# Patient Record
Sex: MALE | URBAN - METROPOLITAN AREA
[De-identification: names, ages, dates, MRNs, and addresses within clinical notes are randomized per-mention and may not be internally consistent; named-entity substitution may affect disease eponyms.]

---

## 2023-04-24 ENCOUNTER — LAB OUTSIDE AN ENCOUNTER (OUTPATIENT)
Dept: URBAN - METROPOLITAN AREA CLINIC 33 | Facility: CLINIC | Age: 43
End: 2023-04-24

## 2023-04-24 ENCOUNTER — OFFICE VISIT (OUTPATIENT)
Dept: URBAN - METROPOLITAN AREA CLINIC 33 | Facility: CLINIC | Age: 43
End: 2023-04-24
Payer: COMMERCIAL

## 2023-04-24 ENCOUNTER — DASHBOARD ENCOUNTERS (OUTPATIENT)
Age: 43
End: 2023-04-24

## 2023-04-24 VITALS
WEIGHT: 163 LBS | BODY MASS INDEX: 22.08 KG/M2 | OXYGEN SATURATION: 98 % | SYSTOLIC BLOOD PRESSURE: 108 MMHG | HEART RATE: 65 BPM | DIASTOLIC BLOOD PRESSURE: 78 MMHG | HEIGHT: 72 IN

## 2023-04-24 DIAGNOSIS — Z80.0 FAMILY HISTORY OF COLON CANCER IN FATHER: ICD-10-CM

## 2023-04-24 DIAGNOSIS — K59.01 SLOW TRANSIT CONSTIPATION: ICD-10-CM

## 2023-04-24 DIAGNOSIS — R19.4 CHANGE IN BOWEL HABIT: ICD-10-CM

## 2023-04-24 PROBLEM — 88111009: Status: ACTIVE | Noted: 2023-04-24

## 2023-04-24 PROBLEM — 35298007: Status: ACTIVE | Noted: 2023-04-24

## 2023-04-24 PROCEDURE — 99203 OFFICE O/P NEW LOW 30 MIN: CPT | Performed by: INTERNAL MEDICINE

## 2023-04-24 RX ORDER — CHOLECALCIFEROL (VITAMIN D3) 50 MCG
1 TABLET TABLET ORAL ONCE A DAY
Status: ACTIVE | COMMUNITY

## 2023-04-24 RX ORDER — POLYETHYLENE GLYCOL 3350 17 G/17G
AS DIRECTED POWDER, FOR SOLUTION ORAL
Status: ACTIVE | COMMUNITY

## 2023-04-24 RX ORDER — SODIUM, POTASSIUM,MAG SULFATES 17.5-3.13G
177ML SOLUTION, RECONSTITUTED, ORAL ORAL
Qty: 1 | Refills: 0 | OUTPATIENT
Start: 2023-04-24 | End: 2023-04-26

## 2023-04-24 RX ORDER — MULTIVITAMIN WITH IRON
AS DIRECTED TABLET ORAL
Status: ACTIVE | COMMUNITY

## 2023-04-24 NOTE — HPI-CONSTIPATION
42 year old male patient presents for constipation consult. Patient states symptoms began three months ago. Patient admits taking Miralax with some relief. Patient admits having 1-2 bowel movement a day. Patient admits stools are normal. Patient denies rectal bleeding /melena. Patient denies strenuous bowel movements.   Patient denies having recent abdominal Xray. Patient admits having recent labs.

## 2023-04-24 NOTE — HPI-HEARTBURN
Patient presents for heartburn consult. Patient states he has been experiencing symptoms since two years ago. Patient denies currently taking any OTC or prescribed medications for relief of symptoms. Patient describes symptoms as tightness in chest and states they are most present at nighttime. Patient denies nausea or vomiting. Patient denies any associated weight loss. Patient denies EGD in the past.

## 2024-08-19 ENCOUNTER — OFFICE VISIT (OUTPATIENT)
Dept: URBAN - METROPOLITAN AREA CLINIC 33 | Facility: CLINIC | Age: 44
End: 2024-08-19
Payer: COMMERCIAL

## 2024-08-19 VITALS
BODY MASS INDEX: 23.16 KG/M2 | OXYGEN SATURATION: 99 % | WEIGHT: 171 LBS | HEIGHT: 72 IN | SYSTOLIC BLOOD PRESSURE: 114 MMHG | DIASTOLIC BLOOD PRESSURE: 74 MMHG | HEART RATE: 80 BPM

## 2024-08-19 DIAGNOSIS — R19.4 CHANGE IN BOWEL HABIT: ICD-10-CM

## 2024-08-19 PROCEDURE — 99243 OFF/OP CNSLTJ NEW/EST LOW 30: CPT | Performed by: INTERNAL MEDICINE

## 2024-08-19 PROCEDURE — 99213 OFFICE O/P EST LOW 20 MIN: CPT | Performed by: INTERNAL MEDICINE

## 2024-08-19 RX ORDER — POLYETHYLENE GLYCOL 3350 17 G/17G
AS DIRECTED POWDER, FOR SOLUTION ORAL
Status: ON HOLD | COMMUNITY

## 2024-08-19 RX ORDER — MULTIVITAMIN WITH IRON
AS DIRECTED TABLET ORAL
Status: ON HOLD | COMMUNITY

## 2024-08-19 RX ORDER — CHOLECALCIFEROL (VITAMIN D3) 50 MCG
1 TABLET TABLET ORAL ONCE A DAY
Status: ON HOLD | COMMUNITY

## 2024-08-19 NOTE — HPI-CONSTIPATION
Patient presents today for a follow-up for constipation - 1-2 years. He denies improvement in constipation. Patient admits taking psyllium husk, just as needed, which helps. He will take a tablespoonful with water in the morning. He is currently having 1 bowel movements per day, without strain. His stools are normal, without the presence of blood, mucus, or melena. He does admit to having "some liquid" leak out of his rectum before he goes to bed. This does not happen every day. Patient denies rectal pain or pruritus ani. Patient states that he was in [place] from May to July of 2024 and he states that he had no constipation issues while he was away. Of note, patient has never had a colonoscopy.   Last visit (04/24/2023) 42 year old male patient presents for constipation consult. Patient states symptoms began three months ago. Patient admits taking Miralax with some relief. Patient admits having 1-2 bowel movement a day. Patient admits stools are normal. Patient denies rectal bleeding /melena. Patient denies strenuous bowel movements.   Patient denies having recent abdominal Xray. Patient admits having recent labs.

## 2024-08-26 ENCOUNTER — LAB OUTSIDE AN ENCOUNTER (OUTPATIENT)
Dept: URBAN - METROPOLITAN AREA CLINIC 33 | Facility: CLINIC | Age: 44
End: 2024-08-26

## 2024-08-29 ENCOUNTER — TELEPHONE ENCOUNTER (OUTPATIENT)
Dept: URBAN - METROPOLITAN AREA CLINIC 36 | Facility: CLINIC | Age: 44
End: 2024-08-29

## 2024-09-27 ENCOUNTER — OFFICE VISIT (OUTPATIENT)
Dept: URBAN - METROPOLITAN AREA SURGERY CENTER 8 | Facility: SURGERY CENTER | Age: 44
End: 2024-09-27

## 2024-10-21 ENCOUNTER — OFFICE VISIT (OUTPATIENT)
Dept: URBAN - METROPOLITAN AREA CLINIC 33 | Facility: CLINIC | Age: 44
End: 2024-10-21